# Patient Record
Sex: FEMALE | Race: BLACK OR AFRICAN AMERICAN | NOT HISPANIC OR LATINO
[De-identification: names, ages, dates, MRNs, and addresses within clinical notes are randomized per-mention and may not be internally consistent; named-entity substitution may affect disease eponyms.]

---

## 2020-03-07 ENCOUNTER — TRANSCRIPTION ENCOUNTER (OUTPATIENT)
Age: 60
End: 2020-03-07

## 2021-01-07 ENCOUNTER — TRANSCRIPTION ENCOUNTER (OUTPATIENT)
Age: 61
End: 2021-01-07

## 2021-01-08 ENCOUNTER — EMERGENCY (EMERGENCY)
Facility: HOSPITAL | Age: 61
LOS: 1 days | Discharge: HOME | End: 2021-01-08
Attending: EMERGENCY MEDICINE | Admitting: EMERGENCY MEDICINE
Payer: COMMERCIAL

## 2021-01-08 VITALS
TEMPERATURE: 98 F | WEIGHT: 279.99 LBS | DIASTOLIC BLOOD PRESSURE: 110 MMHG | RESPIRATION RATE: 18 BRPM | SYSTOLIC BLOOD PRESSURE: 159 MMHG | HEIGHT: 65 IN | HEART RATE: 84 BPM | OXYGEN SATURATION: 100 %

## 2021-01-08 PROCEDURE — 99284 EMERGENCY DEPT VISIT MOD MDM: CPT

## 2021-01-08 PROCEDURE — 25605 CLTX DST RDL FX/EPHYS SEP W/: CPT | Mod: LT

## 2021-01-08 PROCEDURE — 73110 X-RAY EXAM OF WRIST: CPT | Mod: 26,LT,77

## 2021-01-08 PROCEDURE — 99285 EMERGENCY DEPT VISIT HI MDM: CPT | Mod: 25

## 2021-01-08 PROCEDURE — 73110 X-RAY EXAM OF WRIST: CPT | Mod: 26,LT

## 2021-01-08 PROCEDURE — 73110 X-RAY EXAM OF WRIST: CPT

## 2021-01-08 RX ORDER — OXYCODONE AND ACETAMINOPHEN 5; 325 MG/1; MG/1
1 TABLET ORAL ONCE
Refills: 0 | Status: DISCONTINUED | OUTPATIENT
Start: 2021-01-08 | End: 2021-01-08

## 2021-01-08 RX ADMIN — OXYCODONE AND ACETAMINOPHEN 1 TABLET(S): 5; 325 TABLET ORAL at 17:41

## 2021-01-08 NOTE — ED PROVIDER NOTE - PROGRESS NOTE DETAILS
Scribe IN for Dr. Ernandez: 59 y/o female c/o left wrist injury after slipping on stairs at home today. Denies any other injuries. Pt does not follow with a PCP. No pertinent PMHX, no daily medications.  PHYSICAL: Obvious deformity of left wrist with swelling and tenderness, distal ROM and sensations intact. Cap refill <2 seconds. Remainder of exam unremarkable.  PLAN: X-ray wrist, pain medication, ice pack, ortho f/u. Spoke with orthopedist, Dr. Pierce, discussed case and reviewed xrays, advised pt needs reduction, will see pt in ED shortly for reduction. pt reevaluted, reduced by ortho, pt placed in cast, pt to be d/c home follow up with dr ivzcarra, return if any symtpoms worsen

## 2021-01-08 NOTE — ED PROVIDER NOTE - NSFOLLOWUPINSTRUCTIONS_ED_ALL_ED_FT
Wrist Fracture in Adults    WHAT YOU NEED TO KNOW:    A wrist fracture is a break in one or more of the bones in your wrist.     Adult Arm Bones         DISCHARGE INSTRUCTIONS:    Return to the emergency department if:   •Your pain gets worse or does not get better after you take pain medicine.      •Your cast or splint breaks, gets wet, or is damaged.      •Your hand or fingers feel numb or cold.      •Your hand or fingers turn white or blue.      •Your splint or cast feels too tight.      •You have more pain or swelling after the cast or splint is put on.      Call your doctor if:   •You have a fever.      •There is a foul smell or blood coming from under the cast.      •You have questions or concerns about your condition or care.      Medicines: You may need any of the following:   •Prescription pain medicine may be given. Ask your healthcare provider how to take this medicine safely. Some prescription pain medicines contain acetaminophen. Do not take other medicines that contain acetaminophen without talking to your healthcare provider. Too much acetaminophen may cause liver damage. Prescription pain medicine may cause constipation. Ask your healthcare provider how to prevent or treat constipation.       •NSAIDs, such as ibuprofen, help decrease swelling, pain, and fever. NSAIDs can cause stomach bleeding or kidney problems in certain people. If you take blood thinner medicine, always ask your healthcare provider if NSAIDs are safe for you. Always read the medicine label and follow directions.      •Acetaminophen decreases pain and fever. It is available without a doctor's order. Ask how much to take and how often to take it. Follow directions. Read the labels of all other medicines you are using to see if they also contain acetaminophen, or ask your doctor or pharmacist. Acetaminophen can cause liver damage if not taken correctly. Do not use more than 4 grams (4,000 milligrams) total of acetaminophen in one day.       •Take your medicine as directed. Contact your healthcare provider if you think your medicine is not helping or if you have side effects. Tell him or her if you are allergic to any medicine. Keep a list of the medicines, vitamins, and herbs you take. Include the amounts, and when and why you take them. Bring the list or the pill bottles to follow-up visits. Carry your medicine list with you in case of an emergency.      Self-care:   •Rest as much as possible. Do not play contact sports until the healthcare provider says it is okay.      •Apply ice on your wrist for 15 to 20 minutes every hour or as directed. Use an ice pack, or put crushed ice in a plastic bag. Cover it with a towel before you place it on your skin. Ice helps prevent tissue damage and decreases swelling and pain.      •Elevate your wrist above the level of your heart as often as possible. This will help decrease swelling and pain. Prop your wrist on pillows or blankets to keep it elevated comfortably.             Cast or splint care:   •You may take a bath or shower as directed. Do not let your cast or splint get wet. Before bathing, cover the cast or splint with 2 plastic trash bags. Tape the bags to your skin above the cast or splint to seal out the water. Keep your arm out of the water in case the bag breaks. If a plaster cast gets wet and soft, call your healthcare provider.      •Check the skin around the cast or splint every day. You may put lotion on any red or sore areas.      •Do not push down or lean on the cast or brace because it may break.      •Do not scratch the skin under the cast by putting a sharp or pointed object inside the cast.      Go to physical therapy as directed: You may need physical therapy after your wrist heals and the cast is removed. A physical therapist can teach you exercises to help improve movement and strength and to decrease pain.    Follow up with your doctor or bone specialist as directed: You may need to return to have your cast removed. You may also need an x-ray to check how well the bone has healed. Write down your questions so you remember to ask them during your visits.

## 2021-01-08 NOTE — ED PROVIDER NOTE - CARE PROVIDER_API CALL
Juan Pierce (DO)  Orthopaedic Surgery  [address] [place] [address]  Phone: [phone]  Fax: [phone]  Follow Up Time:

## 2021-01-08 NOTE — ED PROVIDER NOTE - CLINICAL SUMMARY MEDICAL DECISION MAKING FREE TEXT BOX
61 y/o F s/p trip and fall today c/o L wrist pain, RHD, +ttp with swelling, LROM and deformity noted to L distal radius, skin intact, NVI, will give pain meds, ice, xrays, ortho consult for reduction

## 2021-01-08 NOTE — ED PROVIDER NOTE - UPPER EXTREMITY EXAM, LEFT
+TTP distal radius with marked swelling and deformity noted with LROM, skin intact, no erythema noted, fingers warm & mobile, cap refill<2sec, pulses and sensation intact, NVI/LIMITED ROM/SWELLING/TENDERNESS

## 2021-01-08 NOTE — ED PROVIDER NOTE - PATIENT PORTAL LINK FT
You can access the FollowMyHealth Patient Portal offered by [facility] by registering at the following website: [email or web address]. By joining [employer]’s FollowMyHealth portal, you will also be able to view your health information using other applications (apps) compatible with our system.

## 2021-01-08 NOTE — ED ADULT NURSE NOTE - OBJECTIVE STATEMENT
walked up 2 steps sole of shoe locked and pt fell off stairs to side no stair rail and put left arm out to break fall pain and swelling to left wrist medial and lateral strong radial pulses rings removed and given to pt  no head trauma  pt offers no other c/o

## 2021-01-08 NOTE — ED CLERICAL - CLERICAL COMMENTS
called dr. hammond office for brayden. called at [phone] and left messagewith service.  called back at 1819.

## 2021-01-08 NOTE — ED ADULT TRIAGE NOTE - NS ED TRIAGE AVPU SCALE
Alert-The patient is alert, awake and responds to voice. The patient is oriented to time, place, and person. The triage nurse is able to obtain subjective information.
0

## 2022-10-24 PROBLEM — Z00.00 ENCOUNTER FOR PREVENTIVE HEALTH EXAMINATION: Status: ACTIVE | Noted: 2022-10-24

## 2022-10-25 ENCOUNTER — APPOINTMENT (OUTPATIENT)
Dept: ORTHOPEDIC SURGERY | Facility: CLINIC | Age: 62
End: 2022-10-25

## 2022-10-25 VITALS — BODY MASS INDEX: 37.86 KG/M2 | HEIGHT: 65.5 IN | WEIGHT: 230 LBS

## 2022-10-25 DIAGNOSIS — M79.18 MYALGIA, OTHER SITE: ICD-10-CM

## 2022-10-25 DIAGNOSIS — Z78.9 OTHER SPECIFIED HEALTH STATUS: ICD-10-CM

## 2022-10-25 PROCEDURE — 99204 OFFICE O/P NEW MOD 45 MIN: CPT | Mod: 25

## 2022-10-25 PROCEDURE — J3490M: CUSTOM

## 2022-10-25 PROCEDURE — 73564 X-RAY EXAM KNEE 4 OR MORE: CPT | Mod: 50

## 2022-10-25 PROCEDURE — 20610 DRAIN/INJ JOINT/BURSA W/O US: CPT | Mod: 50

## 2022-10-25 NOTE — ASSESSMENT
[FreeTextEntry1] : Bilateral  X-Ray Examination of the KNEE (4 views): right severe medial and left severe lateral and b/l patellofemoral degenerate changes.\par \par - We discussed their diagnosis and treatment options at length including the risks and benefits of both surgical and non-surgical options.\par - We will continue conservative treatment with activity modification, PT, icing, weight loss, and anti-inflammatory medications.\par - The patient was provided with a PT prescription to work on ROM, hip ER/abductors strengthening, quad/hamstring stretches and strengthening, and other exercises \par - The patient was advised to let pain guide the gradual advancement of activities. \par - We also discussed the possible of a corticosteroid injection in order to help decrease inflammation and pain so that they can perform better therapy.\par - The risks, benefits, and alternatives to corticosteroid injection were reviewed with the patient and they wished to proceed with this treatment course. \par - Follow up as needed in 6 weeks to re-evaluate, if no improvement we spoke about possibility of viscosupplementation injections\par \par (may eventually need TKA)\par

## 2022-10-25 NOTE — IMAGING
[de-identified] : RIGHT KNEE\par Inspection: mild effusion\par Palpation: medial joint line tenderness, anterior tenderness\par Knee Range of Motion: 3-125 \par Strength: 5/5 Quadriceps strength, 5/5 Hamstring strength\par Neurological: light touch is intact throughout\par Ligament Stability and Special Tests: \par McMurrays: neg\par Lachman: neg\par Pivot Shift: neg\par Posterior Drawer: neg\par Valgus: neg\par Varus: neg\par Patella Apprehension: neg\par Patella Maltracking: neg\par \par \par LEFT KNEE\par Inspection: mild effusion\par Palpation: medial joint line tenderness, anterior tenderness\par Knee Range of Motion: 3-125 \par Strength: 5/5 Quadriceps strength, 5/5 Hamstring strength\par Neurological: light touch is intact throughout\par Ligament Stability and Special Tests: \par McMurrays: neg\par Lachman: neg\par Pivot Shift: neg\par Posterior Drawer: neg\par Valgus: neg\par Varus: neg\par Patella Apprehension: neg\par Patella Maltracking: neg\par

## 2022-10-25 NOTE — HISTORY OF PRESENT ILLNESS
[de-identified] : 62 year old female  (RHD   )   Bilateral knee pain since 9/2022 with no injury R worse than L \par The pain is located Anterior, Distal and medial\par The pain is associated with  Clicking and swelling \par Worse with activity and better at rest.\par Has tried CSI and gel injections in approx 2019 with some relief.\par

## 2022-12-06 ENCOUNTER — APPOINTMENT (OUTPATIENT)
Dept: ORTHOPEDIC SURGERY | Facility: CLINIC | Age: 62
End: 2022-12-06
Payer: COMMERCIAL

## 2022-12-06 PROCEDURE — 20610 DRAIN/INJ JOINT/BURSA W/O US: CPT | Mod: 50

## 2022-12-06 PROCEDURE — 99214 OFFICE O/P EST MOD 30 MIN: CPT | Mod: 25

## 2022-12-06 NOTE — ASSESSMENT
[FreeTextEntry1] :  right severe medial and left severe lateral \par \par - We discussed their diagnosis and treatment options at length including the risks and benefits of both surgical and non-surgical options.\par - We will continue conservative treatment with activity modification, PT, icing, weight loss, and anti-inflammatory medications.\par - The patient was provided with a PT prescription to work on ROM, hip ER/abductors strengthening, quad/hamstring stretches and strengthening, and other exercises \par - The patient was advised to let pain guide the gradual advancement of activities. \par -  we spoke about possibility of viscosupplementation injections and they want to proceed\par - b/l euflexxa #1 given, milan well\par \par (may eventually need TKA)\par

## 2022-12-06 NOTE — IMAGING
[de-identified] : RIGHT KNEE\par Inspection: mild effusion\par Palpation: medial joint line tenderness, anterior tenderness\par Knee Range of Motion: 3-125 \par Strength: 5/5 Quadriceps strength, 5/5 Hamstring strength\par Neurological: light touch is intact throughout\par Ligament Stability and Special Tests: \par McMurrays: neg\par Lachman: neg\par Pivot Shift: neg\par Posterior Drawer: neg\par Valgus: neg\par Varus: neg\par Patella Apprehension: neg\par Patella Maltracking: neg\par \par \par LEFT KNEE\par Inspection: mild effusion\par Palpation: medial joint line tenderness, anterior tenderness\par Knee Range of Motion: 3-125 \par Strength: 5/5 Quadriceps strength, 5/5 Hamstring strength\par Neurological: light touch is intact throughout\par Ligament Stability and Special Tests: \par McMurrays: neg\par Lachman: neg\par Pivot Shift: neg\par Posterior Drawer: neg\par Valgus: neg\par Varus: neg\par Patella Apprehension: neg\par Patella Maltracking: neg\par

## 2022-12-06 NOTE — HISTORY OF PRESENT ILLNESS
[de-identified] : 62 year old female  (RHD   )   Bilateral knee pain since 9/2022 with no injury R worse than L \par The pain is located Anterior, Distal and medial\par The pain is associated with  Clicking and swelling \par Worse with activity and better at rest.\par Has tried CSI and gel injections in approx 2019 with some relief.\par \par 12/6/22 - had b/l CSI (10/25) with some relief, wants to discuss poss visco

## 2022-12-08 ENCOUNTER — NON-APPOINTMENT (OUTPATIENT)
Age: 62
End: 2022-12-08

## 2022-12-13 ENCOUNTER — APPOINTMENT (OUTPATIENT)
Dept: ORTHOPEDIC SURGERY | Facility: CLINIC | Age: 62
End: 2022-12-13
Payer: COMMERCIAL

## 2022-12-13 PROCEDURE — 20610 DRAIN/INJ JOINT/BURSA W/O US: CPT | Mod: 50

## 2022-12-13 PROCEDURE — 99212 OFFICE O/P EST SF 10 MIN: CPT | Mod: 25

## 2022-12-13 NOTE — HISTORY OF PRESENT ILLNESS
[de-identified] : 62 year old female  (RHD   )   Bilateral knee pain since 9/2022 with no injury R worse than L \par The pain is located Anterior, Distal and medial\par The pain is associated with  Clicking and swelling \par Worse with activity and better at rest.\par Has tried CSI and gel injections in approx 2019 with some relief.\par \par 12/6/22 - had b/l CSI (10/25) with some relief, wants to discuss poss visco\par 12/13/22 - Lucas knee euflexxa #2

## 2022-12-13 NOTE — ASSESSMENT
[FreeTextEntry1] :  right severe medial and left severe lateral \par \par - We discussed their diagnosis and treatment options at length including the risks and benefits of both surgical and non-surgical options.\par - We will continue conservative treatment with activity modification, PT, icing, weight loss, and anti-inflammatory medications.\par - The patient was provided with a PT prescription to work on ROM, hip ER/abductors strengthening, quad/hamstring stretches and strengthening, and other exercises \par - The patient was advised to let pain guide the gradual advancement of activities. \par -  we spoke about possibility of viscosupplementation injections and they want to proceed\par - b/l euflexxa #2 given, milan well\par \par (may eventually need TKA)\par

## 2022-12-13 NOTE — IMAGING
[de-identified] : RIGHT KNEE\par Inspection: mild effusion\par Palpation: medial joint line tenderness, anterior tenderness\par Knee Range of Motion: 3-125 \par Strength: 5/5 Quadriceps strength, 5/5 Hamstring strength\par Neurological: light touch is intact throughout\par Ligament Stability and Special Tests: \par McMurrays: neg\par Lachman: neg\par Pivot Shift: neg\par Posterior Drawer: neg\par Valgus: neg\par Varus: neg\par Patella Apprehension: neg\par Patella Maltracking: neg\par \par \par LEFT KNEE\par Inspection: mild effusion\par Palpation: medial joint line tenderness, anterior tenderness\par Knee Range of Motion: 3-125 \par Strength: 5/5 Quadriceps strength, 5/5 Hamstring strength\par Neurological: light touch is intact throughout\par Ligament Stability and Special Tests: \par McMurrays: neg\par Lachman: neg\par Pivot Shift: neg\par Posterior Drawer: neg\par Valgus: neg\par Varus: neg\par Patella Apprehension: neg\par Patella Maltracking: neg\par

## 2022-12-19 PROBLEM — M17.12 ARTHRITIS OF KNEE, LEFT: Status: ACTIVE | Noted: 2022-10-25

## 2022-12-19 PROBLEM — M17.11 ARTHRITIS OF KNEE, RIGHT: Status: ACTIVE | Noted: 2022-10-25

## 2022-12-20 ENCOUNTER — APPOINTMENT (OUTPATIENT)
Dept: ORTHOPEDIC SURGERY | Facility: CLINIC | Age: 62
End: 2022-12-20

## 2022-12-20 DIAGNOSIS — M17.11 UNILATERAL PRIMARY OSTEOARTHRITIS, RIGHT KNEE: ICD-10-CM

## 2022-12-20 DIAGNOSIS — M17.12 UNILATERAL PRIMARY OSTEOARTHRITIS, LEFT KNEE: ICD-10-CM

## 2022-12-20 PROCEDURE — 99212 OFFICE O/P EST SF 10 MIN: CPT | Mod: 25

## 2022-12-20 PROCEDURE — 20610 DRAIN/INJ JOINT/BURSA W/O US: CPT | Mod: 50

## 2022-12-20 NOTE — ASSESSMENT
[FreeTextEntry1] :  right severe medial and left severe lateral \par \par - We discussed their diagnosis and treatment options at length including the risks and benefits of both surgical and non-surgical options.\par - We will continue conservative treatment with activity modification, PT, icing, weight loss, and anti-inflammatory medications.\par - The patient was provided with a PT prescription to work on ROM, hip ER/abductors strengthening, quad/hamstring stretches and strengthening, and other exercises \par - The patient was advised to let pain guide the gradual advancement of activities. \par -  we spoke about possibility of viscosupplementation injections and they want to proceed\par - b/l euflexxa #3 given, milan well\par \par (may eventually need TKA)\par

## 2022-12-20 NOTE — IMAGING
[de-identified] : RIGHT KNEE\par Inspection: mild effusion\par Palpation: medial joint line tenderness, anterior tenderness\par Knee Range of Motion: 3-125 \par Strength: 5/5 Quadriceps strength, 5/5 Hamstring strength\par Neurological: light touch is intact throughout\par Ligament Stability and Special Tests: \par McMurrays: neg\par Lachman: neg\par Pivot Shift: neg\par Posterior Drawer: neg\par Valgus: neg\par Varus: neg\par Patella Apprehension: neg\par Patella Maltracking: neg\par \par \par LEFT KNEE\par Inspection: mild effusion\par Palpation: medial joint line tenderness, anterior tenderness\par Knee Range of Motion: 3-125 \par Strength: 5/5 Quadriceps strength, 5/5 Hamstring strength\par Neurological: light touch is intact throughout\par Ligament Stability and Special Tests: \par McMurrays: neg\par Lachman: neg\par Pivot Shift: neg\par Posterior Drawer: neg\par Valgus: neg\par Varus: neg\par Patella Apprehension: neg\par Patella Maltracking: neg\par

## 2022-12-20 NOTE — HISTORY OF PRESENT ILLNESS
[de-identified] : 62 year old female  (RHD   )   Bilateral knee pain since 9/2022 with no injury R worse than L \par The pain is located Anterior, Distal and medial\par The pain is associated with  Clicking and swelling \par Worse with activity and better at rest.\par Has tried CSI and gel injections in approx 2019 with some relief.\par \par 12/6/22 - had b/l CSI (10/25) with some relief, wants to discuss poss visco\par 12/13/22 - Lucas knee euflexxa #2 \par 12/20/22 - Bilat knee euflexxa #3

## 2024-02-12 NOTE — ED PROVIDER NOTE - CONTEXT
----- Message from Dex Ken MD sent at 2/12/2024  7:36 AM CST -----  A1 c is normal.  PT is normal.  INR is normal.  Continue current plan   SEE HPI/known (describe)

## 2024-05-28 NOTE — PROCEDURE
See H&P in chart   [FreeTextEntry3] : \par Injection Procedure Note:\par \par The risks, benefits, and alternatives to viscosupplementation injection were reviewed with the patient. Risks outlined include but are not limited to infection, sepsis, bleeding, scarring, temporary increase in pain, syncopal episode, failure to resolve symptoms, symptoms recurrence, allergic reaction, flare reaction, pseudoseptic reaction.  Patient understood the risks and asked to proceed with this treatment course.\par \par Patient Identification\par Name/: Verbal with patient and/or family\par \par Procedure Verification:\par Procedure confirmed with patient or family/designee\par Consent for procedure: Verbal Consent Given\par Relevant documentation completed, reviewed, and signed\par Clinical indications for procedure confirmed\par \par Time-out with all members of procedure team immediately prior to procedure:\par Correct patient identified. Agreement on procedure. Correct side and site.\par \par \par KNEE INJECTION (Visco) - BILATERAL\par After verbal consent and identification of the correct patient and correct site, bilateral knees were prepped using alcohol swabs and betadine. This was allowed time to air dry. \par An injection of Euflexxa 2ml  #1 \par was injected into the knees using a sterile 22G needle after ethyl chloride spray for skin anesthesia. The patient tolerated the procedure well. After-care instructions were provided and included instructions to ice the area and to call if redness, pain, or fever develop.\par

## 2024-06-12 ENCOUNTER — NON-APPOINTMENT (OUTPATIENT)
Age: 64
End: 2024-06-12

## 2025-01-31 NOTE — ED PROVIDER NOTE - WR ORDER NAME 1
[facility] Podiatric Surgery  H & P Note    Date: January 31, 2025  Patient: Fausto Dixon  MR #: [id number]  [facility] #: [id number]  Attending/Admitting Physician: Kun Sharp MD    Reason for Consult:  Kun Sharp MD asked me to see Fausto Dixon for evaluation and treatment of left foot infection.     History of Present Illness:  Patient is a 44 y.o. female presents with left foot diabetic foot infection. Referred from my office for surgical debridement and IV antibiotics.    Blood pressure (!) 153/90, pulse (!) 108, temperature 98.1 °F (36.7 °C), temperature source Oral, resp. rate 18, height 1.638 m (5' 4.5\"), weight 104.3 kg (230 lb), SpO2 99%.    Intake/Output Summary (Last 24 hours) at 1/31/2025 0831  Last data filed at 1/30/2025 2139  Gross per 24 hour   Intake 240 ml   Output 0 ml   Net 240 ml     Medical History:  Past Medical History:   Diagnosis Date    Anxiety     Anxiety     Diabetes (HCC)     Hypertension      Past Surgical History:   Procedure Laterality Date    TOE AMPUTATION Right     Partial right third toe amputation     Medications Prior to Admission: amLODIPine (NORVASC) 10 MG tablet, Take 1 tablet by mouth daily  losartan (COZAAR) 100 MG tablet, Take 1 tablet by mouth daily  glipiZIDE (GLUCOTROL XL) 10 MG extended release tablet, Take 1 tablet by mouth daily  [DISCONTINUED] metFORMIN (GLUCOPHAGE) 500 MG tablet, Take 1 tablet by mouth 2 times daily (with meals)  [DISCONTINUED] hydrALAZINE (APRESOLINE) 25 MG tablet, Take 1 tablet by mouth 3 times daily  [DISCONTINUED] fluticasone (FLONASE) 50 MCG/ACT nasal spray, 1 spray by Nasal route daily  Allergies   Allergen Reactions    Hydrocodone     Oxycodone      Family History   Problem Relation Age of Onset    Hypertension Mother     Diabetes Paternal Grandmother      Social History     Tobacco Use   Smoking Status Every Day    Current packs/day: 0.50    Types: Cigarettes   Smokeless Tobacco Never     Social History     Substance  osteomyelitis.    Microbiological: will obtain surgical cultures    Impression:  1.    Ulcer left foot x3 with fat layer exposed  2.    Cellulitis left foot  3.    Osteomyelitis left foot    Plan:  -Evaluation and medical management of left foot limb threatening diabetic foot infection in the setting of cellulitis and suspected osteomyelitis of the fifth metatarsal head.  I have reviewed the imaging and recommended surgical debridement urgently as an add-on case.  I discussed procedure at length including the expected postoperative course, risks, alternatives, and possible complications.  No guarantees were given and the patient has elected to proceed.  N.p.o. and consent ordered.  Moderate risk for deterioration.      - The nature of the finding, probable diagnosis and likely treatment was thoroughly discussed with the patient. The options, risks, complications, alternative treatment as well as some of the differential diagnosis was discussed. The patient was thoroughly informed and all questions were answered. the patient indicated understanding and satisfaction with the discussion.       Umang Montana DPM FACFAS FACCWS FACFAOM  Triple Board Certified Foot & Ankle Specialist  [phone] cell  1/31/2025  8:31 AM     Xray Wrist 3 Views, Left

## 2025-07-10 NOTE — ED ADULT NURSE NOTE - CHPI ED NUR TIMING2
[FreeTextEntry1] : rto 2-4 weeks for prevnar [] : The components of the vaccine(s) to be administered today are listed in the plan of care. The disease(s) for which the vaccine(s) are intended to prevent and the risks have been discussed with the caretaker.  The risks are also included in the appropriate vaccination information statements which have been provided to the patient's caregiver.  The caregiver has given consent to vaccinate. sudden onset